# Patient Record
Sex: FEMALE | Employment: OTHER | URBAN - METROPOLITAN AREA
[De-identification: names, ages, dates, MRNs, and addresses within clinical notes are randomized per-mention and may not be internally consistent; named-entity substitution may affect disease eponyms.]

---

## 2024-04-08 NOTE — PROVIDER TRANSFER
Outside Transfer Acceptance Note / Regional Referral Center    Referring facility: [facility]   Referring provider: HELENA AVINA  Accepting facility: [facility]  Accepting provider: RAQUEL FRANKLIN  Admitting provider: Dr. Raquel Franklin  Reason for transfer: Higher Level of Care   Transfer diagnosis: biliary obstruction  Transfer specialty requested: Pancreatic Billiary  Transfer specialty notified: Yes  Transfer level: NUMBER 1-5: 2  Bed type requested: med-tele  Isolation status: No active isolations   Admission class or status: IP- Inpatient      Narrative     89 year old female with PMHx of HTN and afib (on eliquis and metoprolol) who presented to the ED with abdominal pain radiating to her back, found to have pancreatitis with initial lipase > 3000, EMILE, and elevated LFT's. Patient started on IVF and had HIDA scan which was concerning for common duct obstruction. MRCP done that showed some swelling of the head of the pancreas, concerning for extra-hepatic biliary dilation and concern that there was possible mass there and recommended EUS with ERCP. ERCP performed on Thursday and attempted to stent her but unable to do so due to difficulty visualizing due to angulation of her anatomy. Initial plan was to do repeat ERCP as outpatient however patient has been unable to tolerate PO intake and still with abdominal pain so transfer request for ERCP/EUS at [facility]. Vitals stable, on room air, eliquis has been on hold. Tbili 4.2, now down to normal. LFTs improved. On eliquis which has been on hold. Difficult to treat pain due to her medication allergies. Gotten toradol and dilaudid. Case discussed with Dr. Hudson who will consult on the patient. Per referring provider, can transfer back after ERCP for further management of pain control if needed.      Objective     Vitals:    Recent Labs: All pertinent labs within the past 24 hours have been  reviewed.  Recent imaging: as above   Airway:     Vent settings:         IV access:    Infusions: None  Allergies: Review of patient's allergies indicates:  Not on File   NPO: No    Anticoagulation:   Anticoagulants       None             Instructions      [facility]-  Admit to Hospital Medicine  Upon patient arrival to floor, please send SecureChat to [facility] or call extension [phone] (if no answer, do NOT leave a callback number after the beep, rather please send a SecureChat to [facility]), for Hospital Medicine admit team assignment and for additional admit orders for the patient.  Do not page the attending physician associated with the patient on arrival (this physician may not be on duty at the time of arrival).  Rather, always send a SecureChat to [facility] or call [phone] to reach the triage physician for orders and team assignment.    Consult AES

## 2024-04-12 ENCOUNTER — ANESTHESIA (OUTPATIENT)
Dept: ENDOSCOPY | Facility: HOSPITAL | Age: 89
DRG: 440 | End: 2024-04-12
Payer: MEDICARE

## 2024-04-12 ENCOUNTER — HOSPITAL ENCOUNTER (INPATIENT)
Facility: HOSPITAL | Age: 89
LOS: 1 days | Discharge: ANOTHER HOSPITAL | DRG: 440 | End: 2024-04-12
Attending: STUDENT IN AN ORGANIZED HEALTH CARE EDUCATION/TRAINING PROGRAM | Admitting: STUDENT IN AN ORGANIZED HEALTH CARE EDUCATION/TRAINING PROGRAM
Payer: MEDICARE

## 2024-04-12 ENCOUNTER — ANESTHESIA EVENT (OUTPATIENT)
Dept: ENDOSCOPY | Facility: HOSPITAL | Age: 89
DRG: 440 | End: 2024-04-12
Payer: MEDICARE

## 2024-04-12 VITALS
SYSTOLIC BLOOD PRESSURE: 144 MMHG | WEIGHT: 200 LBS | HEIGHT: 67 IN | HEART RATE: 88 BPM | TEMPERATURE: 98 F | DIASTOLIC BLOOD PRESSURE: 77 MMHG | RESPIRATION RATE: 18 BRPM | OXYGEN SATURATION: 96 % | BODY MASS INDEX: 31.39 KG/M2

## 2024-04-12 DIAGNOSIS — R11.0 NAUSEA: ICD-10-CM

## 2024-04-12 DIAGNOSIS — R07.9 CHEST PAIN: ICD-10-CM

## 2024-04-12 DIAGNOSIS — K85.90 ACUTE PANCREATITIS WITHOUT INFECTION OR NECROSIS, UNSPECIFIED PANCREATITIS TYPE: Primary | ICD-10-CM

## 2024-04-12 DIAGNOSIS — K83.1 BILIARY OBSTRUCTION: ICD-10-CM

## 2024-04-12 PROBLEM — I48.91 ATRIAL FIBRILLATION: Chronic | Status: ACTIVE | Noted: 2024-04-12

## 2024-04-12 PROBLEM — R74.01 TRANSAMINITIS: Status: ACTIVE | Noted: 2024-04-12

## 2024-04-12 PROBLEM — I10 HYPERTENSION: Chronic | Status: ACTIVE | Noted: 2024-04-12

## 2024-04-12 PROBLEM — N18.30 CKD (CHRONIC KIDNEY DISEASE) STAGE 3, GFR 30-59 ML/MIN: Chronic | Status: ACTIVE | Noted: 2024-04-12

## 2024-04-12 PROBLEM — Z86.79 HISTORY OF SICK SINUS SYNDROME: Status: ACTIVE | Noted: 2024-04-12

## 2024-04-12 LAB
ALBUMIN SERPL BCP-MCNC: 2.2 G/DL (ref 3.5–5.2)
ALP SERPL-CCNC: 136 U/L (ref 55–135)
ALT SERPL W/O P-5'-P-CCNC: 29 U/L (ref 10–44)
ANION GAP SERPL CALC-SCNC: 6 MMOL/L (ref 8–16)
AST SERPL-CCNC: 32 U/L (ref 10–40)
BASOPHILS # BLD AUTO: 0.05 K/UL (ref 0–0.2)
BASOPHILS NFR BLD: 0.5 % (ref 0–1.9)
BILIRUB SERPL-MCNC: 0.7 MG/DL (ref 0.1–1)
BNP SERPL-MCNC: 411 PG/ML (ref 0–99)
BUN SERPL-MCNC: 13 MG/DL (ref 10–30)
CALCIUM SERPL-MCNC: 8.8 MG/DL (ref 8.7–10.5)
CHLORIDE SERPL-SCNC: 101 MMOL/L (ref 95–110)
CO2 SERPL-SCNC: 28 MMOL/L (ref 23–29)
CREAT SERPL-MCNC: 0.8 MG/DL (ref 0.5–1.4)
DIFFERENTIAL METHOD BLD: ABNORMAL
EOSINOPHIL # BLD AUTO: 0.1 K/UL (ref 0–0.5)
EOSINOPHIL NFR BLD: 0.6 % (ref 0–8)
ERYTHROCYTE [DISTWIDTH] IN BLOOD BY AUTOMATED COUNT: 16.1 % (ref 11.5–14.5)
EST. GFR  (NO RACE VARIABLE): >60 ML/MIN/1.73 M^2
GLUCOSE SERPL-MCNC: 131 MG/DL (ref 70–110)
HCT VFR BLD AUTO: 33.4 % (ref 37–48.5)
HGB BLD-MCNC: 10.5 G/DL (ref 12–16)
IMM GRANULOCYTES # BLD AUTO: 0.36 K/UL (ref 0–0.04)
IMM GRANULOCYTES NFR BLD AUTO: 3.4 % (ref 0–0.5)
INR PPP: 1 (ref 0.8–1.2)
LIPASE SERPL-CCNC: 15 U/L (ref 4–60)
LYMPHOCYTES # BLD AUTO: 0.8 K/UL (ref 1–4.8)
LYMPHOCYTES NFR BLD: 7.6 % (ref 18–48)
MAGNESIUM SERPL-MCNC: 2 MG/DL (ref 1.6–2.6)
MCH RBC QN AUTO: 31.7 PG (ref 27–31)
MCHC RBC AUTO-ENTMCNC: 31.4 G/DL (ref 32–36)
MCV RBC AUTO: 101 FL (ref 82–98)
MONOCYTES # BLD AUTO: 1.2 K/UL (ref 0.3–1)
MONOCYTES NFR BLD: 11.6 % (ref 4–15)
NEUTROPHILS # BLD AUTO: 8.1 K/UL (ref 1.8–7.7)
NEUTROPHILS NFR BLD: 76.3 % (ref 38–73)
NRBC BLD-RTO: 0 /100 WBC
OHS QRS DURATION: 98 MS
OHS QTC CALCULATION: 427 MS
PHOSPHATE SERPL-MCNC: 4.9 MG/DL (ref 2.7–4.5)
PLATELET # BLD AUTO: 265 K/UL (ref 150–450)
PMV BLD AUTO: 11.6 FL (ref 9.2–12.9)
POTASSIUM SERPL-SCNC: 4.7 MMOL/L (ref 3.5–5.1)
PROT SERPL-MCNC: 5.4 G/DL (ref 6–8.4)
PROTHROMBIN TIME: 11.2 SEC (ref 9–12.5)
RBC # BLD AUTO: 3.31 M/UL (ref 4–5.4)
SODIUM SERPL-SCNC: 135 MMOL/L (ref 136–145)
WBC # BLD AUTO: 10.61 K/UL (ref 3.9–12.7)

## 2024-04-12 PROCEDURE — 43259 EGD US EXAM DUODENUM/JEJUNUM: CPT | Performed by: INTERNAL MEDICINE

## 2024-04-12 PROCEDURE — 25000003 PHARM REV CODE 250

## 2024-04-12 PROCEDURE — 85025 COMPLETE CBC W/AUTO DIFF WBC: CPT

## 2024-04-12 PROCEDURE — 80053 COMPREHEN METABOLIC PANEL: CPT

## 2024-04-12 PROCEDURE — 83735 ASSAY OF MAGNESIUM: CPT

## 2024-04-12 PROCEDURE — 83690 ASSAY OF LIPASE: CPT

## 2024-04-12 PROCEDURE — 25500020 PHARM REV CODE 255: Performed by: HOSPITALIST

## 2024-04-12 PROCEDURE — D9220A PRA ANESTHESIA: Mod: CRNA,,, | Performed by: NURSE ANESTHETIST, CERTIFIED REGISTERED

## 2024-04-12 PROCEDURE — 63600175 PHARM REV CODE 636 W HCPCS

## 2024-04-12 PROCEDURE — 93005 ELECTROCARDIOGRAM TRACING: CPT

## 2024-04-12 PROCEDURE — 99223 1ST HOSP IP/OBS HIGH 75: CPT | Mod: 25,GC,, | Performed by: INTERNAL MEDICINE

## 2024-04-12 PROCEDURE — 83880 ASSAY OF NATRIURETIC PEPTIDE: CPT

## 2024-04-12 PROCEDURE — D9220A PRA ANESTHESIA: Mod: ANES,,, | Performed by: ANESTHESIOLOGY

## 2024-04-12 PROCEDURE — 25000003 PHARM REV CODE 250: Performed by: NURSE ANESTHETIST, CERTIFIED REGISTERED

## 2024-04-12 PROCEDURE — 93010 ELECTROCARDIOGRAM REPORT: CPT | Mod: ,,, | Performed by: INTERNAL MEDICINE

## 2024-04-12 PROCEDURE — 0DJ08ZZ INSPECTION OF UPPER INTESTINAL TRACT, VIA NATURAL OR ARTIFICIAL OPENING ENDOSCOPIC: ICD-10-PCS | Performed by: INTERNAL MEDICINE

## 2024-04-12 PROCEDURE — 37000009 HC ANESTHESIA EA ADD 15 MINS: Performed by: INTERNAL MEDICINE

## 2024-04-12 PROCEDURE — 85610 PROTHROMBIN TIME: CPT

## 2024-04-12 PROCEDURE — 20600001 HC STEP DOWN PRIVATE ROOM

## 2024-04-12 PROCEDURE — 36415 COLL VENOUS BLD VENIPUNCTURE: CPT

## 2024-04-12 PROCEDURE — 97165 OT EVAL LOW COMPLEX 30 MIN: CPT

## 2024-04-12 PROCEDURE — 37000008 HC ANESTHESIA 1ST 15 MINUTES: Performed by: INTERNAL MEDICINE

## 2024-04-12 PROCEDURE — 84100 ASSAY OF PHOSPHORUS: CPT

## 2024-04-12 PROCEDURE — 43259 EGD US EXAM DUODENUM/JEJUNUM: CPT | Mod: ,,, | Performed by: INTERNAL MEDICINE

## 2024-04-12 PROCEDURE — 63600175 PHARM REV CODE 636 W HCPCS: Performed by: NURSE ANESTHETIST, CERTIFIED REGISTERED

## 2024-04-12 PROCEDURE — 97535 SELF CARE MNGMENT TRAINING: CPT

## 2024-04-12 PROCEDURE — 97162 PT EVAL MOD COMPLEX 30 MIN: CPT

## 2024-04-12 PROCEDURE — 97530 THERAPEUTIC ACTIVITIES: CPT

## 2024-04-12 RX ORDER — ONDANSETRON HYDROCHLORIDE 2 MG/ML
4 INJECTION, SOLUTION INTRAVENOUS DAILY PRN
Status: DISCONTINUED | OUTPATIENT
Start: 2024-04-12 | End: 2024-04-12 | Stop reason: HOSPADM

## 2024-04-12 RX ORDER — PROPOFOL 10 MG/ML
VIAL (ML) INTRAVENOUS
Status: DISCONTINUED | OUTPATIENT
Start: 2024-04-12 | End: 2024-04-12

## 2024-04-12 RX ORDER — HYDROMORPHONE HYDROCHLORIDE 1 MG/ML
1 INJECTION, SOLUTION INTRAMUSCULAR; INTRAVENOUS; SUBCUTANEOUS EVERY 6 HOURS PRN
Status: DISCONTINUED | OUTPATIENT
Start: 2024-04-12 | End: 2024-04-12

## 2024-04-12 RX ORDER — OXYBUTYNIN CHLORIDE 10 MG/1
10 TABLET, EXTENDED RELEASE ORAL
COMMUNITY
Start: 2023-11-21

## 2024-04-12 RX ORDER — CYANOCOBALAMIN 1000 UG/ML
1000 INJECTION, SOLUTION INTRAMUSCULAR; SUBCUTANEOUS DAILY
COMMUNITY
Start: 2024-03-25

## 2024-04-12 RX ORDER — FUROSEMIDE 10 MG/ML
20 INJECTION INTRAMUSCULAR; INTRAVENOUS ONCE
Status: COMPLETED | OUTPATIENT
Start: 2024-04-12 | End: 2024-04-12

## 2024-04-12 RX ORDER — HYDROMORPHONE HYDROCHLORIDE 1 MG/ML
0.5 INJECTION, SOLUTION INTRAMUSCULAR; INTRAVENOUS; SUBCUTANEOUS EVERY 6 HOURS PRN
Status: DISCONTINUED | OUTPATIENT
Start: 2024-04-12 | End: 2024-04-12

## 2024-04-12 RX ORDER — SENNOSIDES 8.6 MG/1
8.6 TABLET ORAL DAILY
Status: DISCONTINUED | OUTPATIENT
Start: 2024-04-12 | End: 2024-04-12

## 2024-04-12 RX ORDER — HYDROMORPHONE HYDROCHLORIDE 2 MG/1
2 TABLET ORAL EVERY 4 HOURS PRN
Status: DISCONTINUED | OUTPATIENT
Start: 2024-04-12 | End: 2024-04-13 | Stop reason: HOSPADM

## 2024-04-12 RX ORDER — IBUPROFEN 200 MG
16 TABLET ORAL
Status: DISCONTINUED | OUTPATIENT
Start: 2024-04-12 | End: 2024-04-13 | Stop reason: HOSPADM

## 2024-04-12 RX ORDER — LIDOCAINE HYDROCHLORIDE 20 MG/ML
INJECTION INTRAVENOUS
Status: DISCONTINUED | OUTPATIENT
Start: 2024-04-12 | End: 2024-04-12

## 2024-04-12 RX ORDER — FENTANYL CITRATE 50 UG/ML
25 INJECTION, SOLUTION INTRAMUSCULAR; INTRAVENOUS EVERY 5 MIN PRN
Status: COMPLETED | OUTPATIENT
Start: 2024-04-12 | End: 2024-04-12

## 2024-04-12 RX ORDER — SODIUM CHLORIDE 0.9 % (FLUSH) 0.9 %
10 SYRINGE (ML) INJECTION EVERY 12 HOURS PRN
Status: DISCONTINUED | OUTPATIENT
Start: 2024-04-12 | End: 2024-04-13 | Stop reason: HOSPADM

## 2024-04-12 RX ORDER — GLUCAGON 1 MG
1 KIT INJECTION
Status: DISCONTINUED | OUTPATIENT
Start: 2024-04-12 | End: 2024-04-13 | Stop reason: HOSPADM

## 2024-04-12 RX ORDER — APIXABAN 2.5 MG/1
2.5 TABLET, FILM COATED ORAL 2 TIMES DAILY
COMMUNITY
Start: 2024-03-07

## 2024-04-12 RX ORDER — LISINOPRIL 40 MG/1
40 TABLET ORAL
COMMUNITY
Start: 2024-03-13

## 2024-04-12 RX ORDER — METOPROLOL SUCCINATE 100 MG/1
100 TABLET, EXTENDED RELEASE ORAL
COMMUNITY
Start: 2024-03-20

## 2024-04-12 RX ORDER — NALOXONE HCL 0.4 MG/ML
0.02 VIAL (ML) INJECTION
Status: DISCONTINUED | OUTPATIENT
Start: 2024-04-12 | End: 2024-04-13 | Stop reason: HOSPADM

## 2024-04-12 RX ORDER — METOPROLOL SUCCINATE 100 MG/1
100 TABLET, EXTENDED RELEASE ORAL DAILY
Status: DISCONTINUED | OUTPATIENT
Start: 2024-04-12 | End: 2024-04-13 | Stop reason: HOSPADM

## 2024-04-12 RX ORDER — BISACODYL 10 MG/1
10 SUPPOSITORY RECTAL DAILY PRN
Status: DISCONTINUED | OUTPATIENT
Start: 2024-04-12 | End: 2024-04-13 | Stop reason: HOSPADM

## 2024-04-12 RX ORDER — HYDROMORPHONE HYDROCHLORIDE 1 MG/ML
0.5 INJECTION, SOLUTION INTRAMUSCULAR; INTRAVENOUS; SUBCUTANEOUS EVERY 6 HOURS PRN
Status: DISCONTINUED | OUTPATIENT
Start: 2024-04-12 | End: 2024-04-13 | Stop reason: HOSPADM

## 2024-04-12 RX ORDER — ESCITALOPRAM OXALATE 10 MG/1
10 TABLET ORAL
COMMUNITY
Start: 2024-03-20

## 2024-04-12 RX ORDER — AMLODIPINE BESYLATE 2.5 MG/1
2.5 TABLET ORAL DAILY
COMMUNITY
Start: 2024-03-20

## 2024-04-12 RX ORDER — ESCITALOPRAM OXALATE 10 MG/1
10 TABLET ORAL DAILY
Status: DISCONTINUED | OUTPATIENT
Start: 2024-04-12 | End: 2024-04-13 | Stop reason: HOSPADM

## 2024-04-12 RX ORDER — AMOXICILLIN 250 MG
2 CAPSULE ORAL 2 TIMES DAILY
Status: DISCONTINUED | OUTPATIENT
Start: 2024-04-12 | End: 2024-04-13 | Stop reason: HOSPADM

## 2024-04-12 RX ORDER — SODIUM CHLORIDE 0.9 % (FLUSH) 0.9 %
10 SYRINGE (ML) INJECTION
Status: DISCONTINUED | OUTPATIENT
Start: 2024-04-12 | End: 2024-04-12 | Stop reason: HOSPADM

## 2024-04-12 RX ORDER — TRAZODONE HYDROCHLORIDE 50 MG/1
50 TABLET ORAL NIGHTLY
COMMUNITY

## 2024-04-12 RX ORDER — PANTOPRAZOLE SODIUM 40 MG/1
40 TABLET, DELAYED RELEASE ORAL DAILY
Status: DISCONTINUED | OUTPATIENT
Start: 2024-04-12 | End: 2024-04-13 | Stop reason: HOSPADM

## 2024-04-12 RX ORDER — ASPIRIN 325 MG
50000 TABLET, DELAYED RELEASE (ENTERIC COATED) ORAL
COMMUNITY

## 2024-04-12 RX ORDER — POTASSIUM CHLORIDE 750 MG/1
10 TABLET, EXTENDED RELEASE ORAL DAILY
COMMUNITY
Start: 2024-03-20

## 2024-04-12 RX ORDER — POLYETHYLENE GLYCOL 3350 17 G/17G
17 POWDER, FOR SOLUTION ORAL 2 TIMES DAILY
Status: DISCONTINUED | OUTPATIENT
Start: 2024-04-12 | End: 2024-04-13 | Stop reason: HOSPADM

## 2024-04-12 RX ORDER — FUROSEMIDE 40 MG/1
40 TABLET ORAL
COMMUNITY
Start: 2024-03-20

## 2024-04-12 RX ORDER — IBUPROFEN 200 MG
24 TABLET ORAL
Status: DISCONTINUED | OUTPATIENT
Start: 2024-04-12 | End: 2024-04-13 | Stop reason: HOSPADM

## 2024-04-12 RX ADMIN — DOCUSATE SODIUM AND SENNOSIDES 2 TABLET: 8.6; 5 TABLET, FILM COATED ORAL at 08:04

## 2024-04-12 RX ADMIN — PROPOFOL 100 MCG/KG/MIN: 10 INJECTION, EMULSION INTRAVENOUS at 12:04

## 2024-04-12 RX ADMIN — IOHEXOL 100 ML: 350 INJECTION, SOLUTION INTRAVENOUS at 06:04

## 2024-04-12 RX ADMIN — LIDOCAINE HYDROCHLORIDE 60 MG: 20 INJECTION INTRAVENOUS at 12:04

## 2024-04-12 RX ADMIN — ESCITALOPRAM OXALATE 10 MG: 10 TABLET ORAL at 10:04

## 2024-04-12 RX ADMIN — HYDROMORPHONE HYDROCHLORIDE 0.5 MG: 1 INJECTION, SOLUTION INTRAMUSCULAR; INTRAVENOUS; SUBCUTANEOUS at 06:04

## 2024-04-12 RX ADMIN — POLYETHYLENE GLYCOL 3350 17 G: 17 POWDER, FOR SOLUTION ORAL at 08:04

## 2024-04-12 RX ADMIN — SENNOSIDES 8.6 MG: 8.6 TABLET, FILM COATED ORAL at 08:04

## 2024-04-12 RX ADMIN — HYDROMORPHONE HYDROCHLORIDE 2 MG: 2 TABLET ORAL at 04:04

## 2024-04-12 RX ADMIN — METOPROLOL SUCCINATE 100 MG: 100 TABLET, EXTENDED RELEASE ORAL at 08:04

## 2024-04-12 RX ADMIN — FENTANYL CITRATE 25 MCG: 50 INJECTION INTRAMUSCULAR; INTRAVENOUS at 01:04

## 2024-04-12 RX ADMIN — PROPOFOL 70 MG: 10 INJECTION, EMULSION INTRAVENOUS at 12:04

## 2024-04-12 RX ADMIN — HYDROMORPHONE HYDROCHLORIDE 1 MG: 1 INJECTION, SOLUTION INTRAMUSCULAR; INTRAVENOUS; SUBCUTANEOUS at 04:04

## 2024-04-12 RX ADMIN — FUROSEMIDE 20 MG: 10 INJECTION, SOLUTION INTRAMUSCULAR; INTRAVENOUS at 04:04

## 2024-04-12 RX ADMIN — PANTOPRAZOLE SODIUM 40 MG: 40 TABLET, DELAYED RELEASE ORAL at 08:04

## 2024-04-12 RX ADMIN — SODIUM CHLORIDE: 9 INJECTION, SOLUTION INTRAVENOUS at 12:04

## 2024-04-12 NOTE — CONSULTS
[facility] - Telemetry Stepdown  [facility] Consult Note    Patient Name: Zeny Tijerina  MRN: [id number]  Admission Date: 4/12/2024  Hospital Length of Stay: 0 days  Code Status: DNR   Attending Provider: Jessenia Rossi*   Consulting Provider: Chester Thurman MD  Primary Care Physician: Thuy, Primary Doctor  Principal Problem:Transaminitis    Inpatient consult to [facility] ([facility])  Consult performed by: Chester Thurman MD  Consult ordered by: Shefali Bravo MD        Subjective:     HPI:  Ms. Zeny Tijerina is a 89 year old female for whom AES is consulted for evaluation of biliary obstruction. She has a PMH significant for atrial fibrillation (on Apixaban), HTN, MICHELLE, and sick sinus syndrome (status post PPM).     Hospital Course:   Patient was admitted to [facility] on 3/30/2024 for management of acute pancreatitis associated with EMILE after developing acute onset bilateral upper abdominal pain radiating to the back. CTA C/A/P obtained on admission to investigate possible aortic dissection was only notable for AAA with inflammatory changes around pancreas. She underwent an MRCP on 3/31 that was notable for extra-hepatic biliary dilatation without associated stone or lesion seen. GI was consulted and patient underwent ERCP on 4/4 with inability to cannulate biliary tree due to reported difficulty visualizing the ampulla. EMILE and elevated LFT's progressively improved post-procedure and bilirubin normalized by 4/8. Patient continued to experience post-prandial abdominal pain and decision was made to transfer patient for consideration of repeat endoscopic evaluation. She was accepted for transfer on 4/8 and arrived here on 4/12.     On initial bedside interview, patient reports continued bilateral upper abdominal pain that is aggravated by attempted PO intake. She reports only consuming liquids since diet was resumed at [facility]. She denies prior lifetime history of pancreatitis or liver disease,  "melena, hematochezia, pale colored stools, preceding skin/eye yellowing, and prior abdominal surgeries.     No past medical history on file.    No past surgical history on file.    Review of patient's allergies indicates:   Allergen Reactions    Digoxin Anaphylaxis and Shortness Of Breath     Pt states that "she almost  from taking this medication"    Meperidine Shortness Of Breath    Morphine Shortness Of Breath    Penicillins Anaphylaxis    Ciprofloxacin Itching    Opioids - morphine analogues Itching, Nausea And Vomiting and Rash     Family History    None       Tobacco Use    Smoking status: Not on file    Smokeless tobacco: Not on file   Substance and Sexual Activity    Alcohol use: Not on file    Drug use: Not on file    Sexual activity: Not on file     Review of Systems   Constitutional:  Positive for fatigue. Negative for appetite change, chills and fever.   HENT:  Negative for congestion and trouble swallowing.    Eyes:  Negative for pain and redness.   Respiratory:  Negative for cough and shortness of breath.    Cardiovascular:  Negative for chest pain and palpitations.   Gastrointestinal:  Positive for abdominal pain. Negative for blood in stool, constipation, diarrhea, nausea and vomiting.   Genitourinary:  Negative for difficulty urinating and dysuria.   Musculoskeletal:  Negative for back pain and neck pain.   Skin:  Negative for rash.   Neurological:  Negative for dizziness, light-headedness and headaches.     Objective:     Vital Signs (Most Recent):  Temp: 99 °F (37.2 °C) (24)  Pulse: 84 (24 08)  Resp: 19 (24)  BP: (!) 146/63 (24)  SpO2: 98 % (2442) Vital Signs (24h Range):  Temp:  [97.8 °F (36.6 °C)-99 °F (37.2 °C)] 99 °F (37.2 °C)  Pulse:  [78-87] 84  Resp:  [16-19] 19  SpO2:  [94 %-98 %] 98 %  BP: (125-146)/(60-74) 146/63        There is no height or weight on file to calculate BMI.      Intake/Output Summary (Last 24 hours) at 2024 " 0943  Last data filed at 4/12/2024 0648  Gross per 24 hour   Intake --   Output 350 ml   Net -350 ml       Lines/Drains/Airways       Peripheral Intravenous Line  Duration                  Peripheral IV - Single Lumen 04/11/24 0000 Anterior;Right Upper Arm 1 day                     Physical Exam  Constitutional:       Appearance: Normal appearance. She is not ill-appearing.   Eyes:      General: No scleral icterus.  Cardiovascular:      Rate and Rhythm: Normal rate and regular rhythm.      Pulses: Normal pulses.      Heart sounds: Normal heart sounds.   Pulmonary:      Effort: Pulmonary effort is normal. No respiratory distress.      Breath sounds: Normal breath sounds.   Abdominal:      General: Bowel sounds are normal. There is no distension.      Palpations: Abdomen is soft.      Tenderness: There is abdominal tenderness in the right upper quadrant, epigastric area and left upper quadrant.   Skin:     General: Skin is warm and dry.      Coloration: Skin is not jaundiced.      Findings: No rash.   Neurological:      Mental Status: She is alert and oriented to person, place, and time.          Significant Labs:  All pertinent lab results from the last 24 hours have been reviewed.    Significant Imaging:  Imaging results within the past 24 hours have been reviewed.  Assessment/Plan:     GI  Acute pancreatitis  This is a 89 year old female with a PMH significant for atrial fibrillation (on Apixaban), HTN, MICHELLE, and sick sinus syndrome (status post PPM) who was admitted to [facility] on 4/12 as a transfer from [place] for management of acute pancreatitis with suspected biliary etiology following initial presentation on 3/30. She is status post attempted ERCP at [place] on 4/4 with inability to cannulate due to reported difficulty visualizing ampulla. LFT's have progressively improved since admission with bilirubin normalizing by 4/2. She has no signs/symptoms of cholangitis here or on initial admission. Per review  of MAR from [place], last dose of Apixaban was on 4/2.     Recommendations:     -Will proceed with EUS +/- ERCP today.   -Keep NPO.         Thank you for your consult. I will follow-up with patient. Please contact us if you have any additional questions.    Chester Thurman MD  Gastroenterology  [facility] - Telemetry Stepdown

## 2024-04-12 NOTE — PT/OT/SLP EVAL
Occupational Therapy  Co -  Evaluation with PT    Co-evaluation/treatment performed due to patient's multiple deficits requiring two skilled therapists to appropriately and safely assess patient's strength and endurance while facilitating functional tasks in addition to accommodating for patient's activity tolerance.       Name: Zeny Tijerina  MRN: [id number]  Admitting Diagnosis: Transaminitis  Recent Surgery: Procedure(s) (LRB):  ERCP (ENDOSCOPIC RETROGRADE CHOLANGIOPANCREATOGRAPHY) (N/A)  ULTRASOUND, UPPER GI TRACT, ENDOSCOPIC (N/A) Day of Surgery    Recommendations:     Discharge Recommendations: Moderate Intensity Therapy  Discharge Equipment Recommendations:  walker, rolling, bedside commode, shower chair  Barriers to discharge:   increased skilled A needed    Assessment:     Zeny Tijerina is a 89 y.o. female with a medical diagnosis of Transaminitis.  She presents with performance deficits affecting function: weakness, impaired endurance, impaired self care skills, impaired sensation, impaired functional mobility, gait instability, impaired balance, decreased coordination, decreased upper extremity function, decreased lower extremity function, decreased safety awareness, pain, decreased ROM, impaired coordination, impaired fine motor.      Pt engaged well in therapy this date, encountered supine with pleasant demeanor, agreeable to therapy. The patient has a history of falls and is a fall risk.  This date, pt required min-mod A of 2 persons for bed mobility, and STS transfer from EOB with mod A of 2 persons with EOB raised.  Pt able to sit EOB ~20min with SBA for good balance, though demo'd forward flexion with fatigue and to offload back pain. Patient currently demonstrates a need for moderate intensity therapy on a daily basis post acute secondary to a decline in functional status due to illness.       Rehab Prognosis: Good; patient would benefit from acute skilled OT services to address these deficits and  "reach maximum level of function.       Plan:     Patient to be seen 4 x/week to address the above listed problems via self-care/home management, therapeutic activities, therapeutic exercises  Plan of Care Expires: 05/12/24  Plan of Care Reviewed with: patient    Subjective     Chief Complaint: "I'm having some pain in my back"   Patient/Family Comments/goals: Get better, return home     Occupational Profile:  Living Environment: Pt lives with  (94yo) and daughter who currently has a broken shoulder.  They live in [facility] with small threshold step, WIS +stool +gb.   Previous level of function: Pt reports a fall 3-4 months ago 2* "balance" issues.  Prior to admit, PLOF was independence in ADLs and mod I with functional mobility using rollator   Roles and Routines:   Equipment Used at Home: rollator & 3 pt cane  Assistance upon Discharge: family ( is 94yo, daughter has broken shoulder)    Pain/Comfort:  Pain Rating 1: 7/10  Location - Side 1: Bilateral  Location 1: back  Pain Addressed 1: Reposition, Distraction  Pain Rating Post-Intervention 1: 9/10    Patients cultural, spiritual, Moravian conflicts given the current situation: no    Objective:     Communicated with: BRENDAN Rashid prior to session.  Patient found supine with bed alarm, peripheral IV, PureWick, oxygen, telemetry upon OT entry to room.    General Precautions: Standard, fall  Orthopedic Precautions: N/A  Braces: N/A  Respiratory Status: Nasal cannula, flow 2 L/min    Occupational Performance:    Bed Mobility:    Patient completed Rolling/Turning to Left with  minimum assistance across 6 trials  Patient completed Rolling/Turning to Right with minimum assistance across 6 trials  Patient completed Scooting/Bridging   To HOB while sitting EOB with maximal assistance of 2 persons across 2 trials  To EOB while sitting with max A  To middle of bed while sitting EOB with max A of 2 persons  To HOB while supine with bed in trendelenberg, total A of 2 " "persons   Patient completed Supine to Sit with minimum assistance and 2 persons  Patient completed Sit to Supine with moderate assistance and 2 persons  Pt sat EOB ~20min with good balance, after stands pt sat in forward flexion to offload back pain and 2* fatigue    Functional Mobility/Transfers:  Patient completed Sit <> Stand Transfer across 2 trials:   1st trial: from EOB with maximal assistance of 2 persons  with  RW, unable to clear hips from bed  2nd trial: mod A of 2 persons with RW, verbal cues for upright posture, limited 2* pain and fatigue. While standing ~1min, pt's brief removed    Activities of Daily Living:  Upper Body Dressing: moderate assistance doffing soiled gown, donning fresh gown   Lower Body Dressing: maximal assistance doffing soiled brief   Toileting: total assistance completing sheyla-care and replacing Purewick    Cognitive/Visual Perceptual:  Cognitive/Psychosocial Skills:     -       Oriented to: AOx4   -       Follows Commands/attention:Follows multistep  commands  -       Communication: clear/fluent  -       Memory: No Deficits noted  -       Safety awareness/insight to disability: impaired   -       Mood/Affect/Coping skills/emotional control: Pleasant  Visual/Perceptual:      -Intact      Physical Exam:  Balance:    -       Sitting: Good. Standing: Poor  Postural examination/scapula alignment:    -       Rounded shoulders  -       Forward head  Skin integrity: Visible skin intact  Edema:  Moderate BLE  Sensation:    -       Impaired  "tingling in my arms and hands"   Motor Planning:    -       Impaired  Dominant hand:    -       Right  Upper Extremity Range of Motion:     -       Right Upper Extremity: WFL  -       Left Upper Extremity: WFL  Upper Extremity Strength:    -       Right Upper Extremity: WFL  -       Left Upper Extremity: WFL   Strength:    -       Right Upper Extremity: WFL  -       Left Upper Extremity: WFL  Neurological:    -       Impaired    [facility] 6 Click " ADL:  AMPAC Total Score: 17    Treatment & Education:  Pt educated on role of OT, POC, and goals for therapy.    POC was dicussed with patient/caregiver, who was included in its development and is in agreement with the identified goals and treatment plan.   Patient and family aware of patient's deficits and therapy progression.   Time provided for therapeutic counseling and discussion of health disposition.   Educated on importance of EOB/OOB mobility, maintaining routine, sitting up in chair, and maximizing independence with ADLs during admission   Pt completed ADLs and functional mobility for treatment session as noted above   Pt/caregiver verbalized understanding and expressed no further concerns/questions.  Updated communication board with level of assist required      Patient left supine with all lines intact, call button in reach, bed alarm on, and RN notified    GOALS:   Multidisciplinary Problems       Occupational Therapy Goals          Problem: Occupational Therapy    Goal Priority Disciplines Outcome Interventions   Occupational Therapy Goal     OT, PT/OT Ongoing, Progressing    Description: Goals to be met by: 5/12/24     Patient will increase functional independence with ADLs by performing:    UE Dressing with Contact Guard Assistance.  LE Dressing with Moderate Assistance.  Grooming while EOB with Stand-by Assistance.  Toileting from bedside commode with Contact Guard Assistance for hygiene and clothing management.   Rolling to Bilateral with Contact Guard Assistance.   Supine to sit with Contact Guard Assistance.  Stand pivot transfers with Moderate Assistance.with AD as needed  Step transfer with Moderate Assistance with AD as needed  Toilet transfer to bedside commode with Moderate Assistance with AD as needed.                         History:     Past Medical History:   Diagnosis Date    Atrial fibrillation     CKD (chronic kidney disease)     MICHELLE (obstructive sleep apnea)        History reviewed.  No pertinent surgical history.    Time Tracking:     OT Date of Treatment: 04/12/24  OT Start Time: 0903  OT Stop Time: 0943  OT Total Time (min): 40 min    Billable Minutes:Evaluation 8  Self Care/Home Management 32    4/12/2024

## 2024-04-12 NOTE — ANESTHESIA PREPROCEDURE EVALUATION
"[facility]  Anesthesia Pre-Operative Evaluation   2024        Zeny Tijerina, 10/10/1930  [id number]  Procedure(s) (LRB):  ERCP (ENDOSCOPIC RETROGRADE CHOLANGIOPANCREATOGRAPHY) (N/A)  ULTRASOUND, UPPER GI TRACT, ENDOSCOPIC (N/A)    Subjective    Zeny Tijerina is a 89 y.o. female w/ a significant PMHx of HTN, MICHELLE, Sick sinus syndrome (s/p PPM), CKD3, Afib. Admitted for biliary obstruction.    Patient now presents for above procedure(s).     Level of Care: Med/Surg  Hemodynamics: No vasopressor or inotropic support.  Respiratory Status: Room air    ECHO:  No results found for this or any previous visit.      Prev Airway: None documented.    LDA:        Peripheral IV - Single Lumen 24 0000 Anterior;Right Upper Arm (Active)   Number of days: 1       Drips: None documented.      Patient Active Problem List   Diagnosis    Acute pancreatitis    Transaminitis    Atrial fibrillation    CKD (chronic kidney disease) stage 3, GFR 30-59 ml/min    Hypertension    History of sick sinus syndrome       Review of patient's allergies indicates:   Allergen Reactions    Digoxin Anaphylaxis and Shortness Of Breath     Pt states that "she almost  from taking this medication"    Meperidine Shortness Of Breath    Morphine Shortness Of Breath    Penicillins Anaphylaxis    Ciprofloxacin Itching    Opioids - morphine analogues Itching, Nausea And Vomiting and Rash       Current Inpatient Medications:    EScitalopram oxalate  10 mg Oral Daily    metoprolol succinate  100 mg Oral Daily    pantoprazole  40 mg Oral Daily    senna  8.6 mg Oral Daily       No current facility-administered medications on file prior to encounter.     Current Outpatient Medications on File Prior to Encounter   Medication Sig Dispense Refill    amLODIPine (NORVASC) 2.5 MG tablet Take 2.5 mg by mouth once daily.      cyanocobalamin 1,000 mcg/mL injection Inject 1,000 mcg into the muscle once daily.      ELIQUIS 2.5 mg Tab Take 2.5 mg by " mouth 2 (two) times daily.      EScitalopram oxalate (LEXAPRO) 10 MG tablet Take 10 mg by mouth.      furosemide (LASIX) 40 MG tablet Take 40 mg by mouth.      lisinopriL (PRINIVIL,ZESTRIL) 40 MG tablet Take 40 mg by mouth.      metoprolol succinate (TOPROL-XL) 100 MG 24 hr tablet Take 100 mg by mouth.      oxybutynin (DITROPAN-XL) 10 MG 24 hr tablet Take 10 mg by mouth.      potassium chloride (KLOR-CON) 10 MEQ TbSR Take 10 mEq by mouth once daily.      cholecalciferol, vitamin D3, 1,250 mcg (50,000 unit) capsule Take 50,000 Units by mouth every 7 days.      traZODone (DESYREL) 50 MG tablet Take 50 mg by mouth every evening.         No past surgical history on file.    Social History:  Tobacco Use: Not on file       Alcohol Use: Not on file       Objective    Vital Signs Range:  BMI Readings from Last 1 Encounters:   No data found for BMI       Temp:  [36.6 °C (97.9 °F)-37.2 °C (99 °F)]   Pulse:  [84-87]   Resp:  [16-19]   BP: (125-146)/(63-74)   SpO2:  [97 %-98 %]        Significant Labs:        Component Value Date/Time    WBC 10.61 04/12/2024 0556    HGB 10.5 (L) 04/12/2024 0556    HCT 33.4 (L) 04/12/2024 0556     04/12/2024 0556     (L) 04/12/2024 0556    K 4.7 04/12/2024 0556     04/12/2024 0556    CO2 28 04/12/2024 0556     (H) 04/12/2024 0556    BUN 13 04/12/2024 0556    CREATININE 0.8 04/12/2024 0556    MG 2.0 04/12/2024 0556    PHOS 4.9 (H) 04/12/2024 0556    CALCIUM 8.8 04/12/2024 0556    ALBUMIN 2.2 (L) 04/12/2024 0556    PROT 5.4 (L) 04/12/2024 0556    ALKPHOS 136 (H) 04/12/2024 0556    BILITOT 0.7 04/12/2024 0556    AST 32 04/12/2024 0556    ALT 29 04/12/2024 0556    INR 1.0 04/12/2024 0556        Please see Results Review for additional labs.     Diagnostic Studies: All relevant studies, reviewed.      EKG:   Results for orders placed or performed during the hospital encounter of 04/12/24   EKG 12-lead    Collection Time: 04/12/24  6:03 AM   Result Value Ref Range    QRS  Duration 98 ms    [facility] QTC Calculation 427 ms    Narrative    Test Reason : R11.0,    Vent. Rate : 084 BPM     Atrial Rate : 000 BPM     P-R Int : 000 ms          QRS Dur : 098 ms      QT Int : 362 ms       P-R-T Axes : 000 039 154 degrees     QTc Int : 427 ms    Atrial fibrillation  T wave abnormality, consider lateral ischemia  Abnormal ECG  No previous ECGs available  Confirmed by Neri VALENZUELA MD ([id number]) on 4/12/2024 9:41:05 AM    Referred By: HELENA AVINA           Confirmed By:Neri VALENZUELA MD                                                                                                                  04/12/2024  Zeny Tijerina is a 89 y.o., female.      Pre-op Assessment    I have reviewed the Patient Summary Reports.     I have reviewed the Nursing Notes. I have reviewed the NPO Status.   I have reviewed the Medications.     Review of Systems  Anesthesia Hx:  No problems with previous Anesthesia   Neg history of prior surgery.          Denies Family Hx of Anesthesia complications.    Denies Personal Hx of Anesthesia complications.                    Cardiovascular:  Exercise tolerance: good   Hypertension    Denies CAD.    Denies CABG/stent. Dysrhythmias atrial fibrillation   Denies CHF.    no hyperlipidemia   ECG has been reviewed. Sick sinus syndrome s/p PPM                         Pulmonary:    Denies COPD.  Denies Asthma.    Sleep Apnea                Renal/:  Chronic Renal Disease, CKD                Hepatic/GI:      Denies GERD.   Biliary obstruction          Neurological:    Denies CVA.    Denies Headaches. Denies Seizures.                                Endocrine:  Denies Diabetes.         Denies Obesity / BMI > 30  Psych:  Denies Psychiatric History.                  Physical Exam  General: Well nourished, Cooperative, Alert and Oriented    Airway:  Mallampati: II / II  Mouth Opening: Small, but > 3cm  TM Distance: Normal  Tongue: Normal  Neck ROM: Normal ROM    Dental:  Partial  Dentures    Chest/Lungs:  PPM      Anesthesia Plan  Type of Anesthesia, risks & benefits discussed:    Anesthesia Type: Gen ETT, Gen Natural Airway, MAC  Intra-op Monitoring Plan: Standard ASA Monitors  Post Op Pain Control Plan: multimodal analgesia and IV/PO Opioids PRN  Induction:  IV  Airway Plan: Direct and Video, Post-Induction  Informed Consent: Informed consent signed with the Patient and all parties understand the risks and agree with anesthesia plan.  All questions answered. Patient consented to blood products? Yes  ASA Score: 3  Day of Surgery Review of History & Physical: H&P Update referred to the surgeon/provider.  Anesthesia Plan Notes: Plan for general natural airway, discussed anesthetic risks, questions answered    Ready For Surgery From Anesthesia Perspective.     .

## 2024-04-12 NOTE — PROVATION PATIENT INSTRUCTIONS
Discharge Summary/Instructions after an Endoscopic Procedure  Patient Name: Zeny Tijerina  Patient MRN: [id number]  Patient YOB: 1930  Friday, April 12, 2024  Marco Hudsno MD  Dear patient,  As a result of recent federal legislation (The Federal Cures Act), you may   receive lab or pathology results from your procedure in your [facility]   account before your physician is able to contact you. Your physician or   their representative will relay the results to you with their   recommendations at their soonest availability.  Thank you,  RESTRICTIONS:  During your procedure today, you received medications for sedation.  These   medications may affect your judgment, balance and coordination.  Therefore,   for 24 hours, you have the following restrictions:   - DO NOT drive a car, operate machinery, make legal/financial decisions,   sign important papers or drink alcohol.    ACTIVITY:  Today: no heavy lifting, straining or running due to procedural   sedation/anesthesia.  The following day: return to full activity including work.  DIET:  Eat and drink normally unless instructed otherwise.     TREATMENT FOR COMMON SIDE EFFECTS:  - Mild abdominal pain, nausea, belching, bloating or excessive gas:  rest,   eat lightly and use a heating pad.  - Sore Throat: treat with throat lozenges and/or gargle with warm salt   water.  - Because air was used during the procedure, expelling large amounts of air   from your rectum or belching is normal.  - If a bowel prep was taken, you may not have a bowel movement for 1-3 days.    This is normal.  SYMPTOMS TO WATCH FOR AND REPORT TO YOUR PHYSICIAN:  1. Abdominal pain or bloating, other than gas cramps.  2. Chest pain.  3. Back pain.  4. Signs of infection such as: chills or fever occurring within 24 hours   after the procedure.  5. Rectal bleeding, which would show as bright red, maroon, or black stools.   (A tablespoon of blood from the rectum is not serious, especially if    hemorrhoids are present.)  6. Vomiting.  7. Weakness or dizziness.  GO DIRECTLY TO THE NEAREST EMERGENCY ROOM IF YOU HAVE ANY OF THE FOLLOWING:      Difficulty breathing              Chills and/or fever over 101 F   Persistent vomiting and/or vomiting blood   Severe abdominal pain   Severe chest pain   Black, tarry stools   Bleeding- more than one tablespoon   Any other symptom or condition that you feel may need urgent attention  Your doctor recommends these additional instructions:  If any biopsies were taken, your doctors clinic will contact you in 1 to 2   weeks with any results.  - Return patient to [facility] for [facility] care.   - Resume regular diet.   - Perform CT scan (computed tomography) of the abdomen with contrast at   appointment to be scheduled.   - May need repeat imaging via EUS once pancreatitis improves  For questions, problems or results please call your physician - Marco Hudson MD at Work:  [phone].  [facility], EMERGENCY ROOM PHONE NUMBER: [phone]  IF A COMPLICATION OR EMERGENCY SITUATION ARISES AND YOU ARE UNABLE TO REACH   YOUR PHYSICIAN - GO DIRECTLY TO THE EMERGENCY ROOM.  Marco Hudson MD  4/12/2024 1:07:48 PM  This report has been verified and signed electronically.  Dear patient,  As a result of recent federal legislation (The Federal Cures Act), you may   receive lab or pathology results from your procedure in your [facility]   account before your physician is able to contact you. Your physician or   their representative will relay the results to you with their   recommendations at their soonest availability.  Thank you,  PROVATION

## 2024-04-12 NOTE — NURSING
Nurses Note -- 4 Eyes      4/12/2024   3:42 AM      Skin assessed during: Admit      [x] No Altered Skin Integrity Present    []Prevention Measures Documented      [] Yes- Altered Skin Integrity Present or Discovered   [] LDA Added if Not in Epic (Describe Wound)   [] New Altered Skin Integrity was Present on Admit and Documented in LDA   [] Wound Image Taken    Wound Care Consulted? No    Attending Nurse:  Haider CASTILLO    Second RN/Staff Member:  Henny MCMANUS

## 2024-04-12 NOTE — H&P
[facility] Medicine  History & Physical    Patient Name: Zeny Tijerina  MRN: [id number]  Patient Class: IP- Inpatient  Admission Date: 4/12/2024  Attending Physician: Jessenia Rossi*   Primary Care Provider: Thuy, Primary Doctor         Patient information was obtained from patient, past medical records, and ER records.     Subjective:     Principal Problem:Transaminitis    Chief Complaint: No chief complaint on file.       HPI: Zeny Tijerina is a 89yo female with HTN, afib (on eliquis), SSS s/p PCM, CKD3 who presented as a transfer from [place] for ERCP with difficult anatomy. She presented to the ED 3/30/24 with abdominal pain radiating to her back, found to have pancreatitis with initial lipase > 3000, EMILE, and elevated LFT's. MRCP with extrahepatic biliary dilatation without a definite filling defect identified to suggest choledocholithiasis, gradual tapering of the common bile duct which could represent stricture at the ampulla, concern for a mass, and acute pancreatitis HIDA scan which was concerning for common duct obstruction. She was started on IVF, but they had to be discontinued due to development of pulmonary edema. Echo showed borderline low EF. ERCP performed on Thursday and attempted to stent her but unable to do so due to difficulty visualizing due to angulation of her anatomy. Initial plan was to do repeat ERCP as outpatient however patient has been unable to tolerate PO intake and still with abdominal pain so transfer request for ERCP/EUS at [facility]. Vitals stable, on room air. Tbili 4.2, now down to normal. LFTs improved.     On arrival to [facility], patient afebrile, VSS, on 2L NC SpO2 97%. She reports that she continues to have upper abdominal pain with radiation to back. She denies nausea/vomiting. She reports last BM was 2 days after admission to [place], but she has only been taking in liquids. Denies f/c. Some SOB, does not wear O2 at home.      No past medical history on file.    No past surgical history on file.    Review of patient's allergies indicates:  Not on File    No current facility-administered medications on file prior to encounter.     No current outpatient medications on file prior to encounter.     Family History    None       Tobacco Use    Smoking status: Not on file    Smokeless tobacco: Not on file   Substance and Sexual Activity    Alcohol use: Not on file    Drug use: Not on file    Sexual activity: Not on file     Review of Systems   Constitutional:  Negative for appetite change, chills and fever.   HENT:  Negative for congestion.    Respiratory:  Negative for cough and shortness of breath.    Cardiovascular:  Positive for leg swelling. Negative for chest pain.   Gastrointestinal:  Positive for abdominal pain and constipation. Negative for blood in stool, diarrhea, nausea and vomiting.   Genitourinary:  Negative for difficulty urinating.   Musculoskeletal:  Positive for back pain.   Skin:  Negative for rash.   Neurological:  Negative for light-headedness.   Psychiatric/Behavioral:  Negative for confusion.      Objective:     Vital Signs (Most Recent):  Temp: 97.9 °F (36.6 °C) (04/12/24 0318)  Pulse: 87 (04/12/24 0318)  Resp: 16 (04/12/24 0318)  BP: 125/74 (04/12/24 0318)  SpO2: 97 % (04/12/24 0318) Vital Signs (24h Range):  Temp:  [97.8 °F (36.6 °C)-98.7 °F (37.1 °C)] 97.9 °F (36.6 °C)  Pulse:  [78-87] 87  Resp:  [16] 16  SpO2:  [94 %-99 %] 97 %  BP: (125-145)/(60-74) 125/74        There is no height or weight on file to calculate BMI.     Physical Exam  Vitals and nursing note reviewed.   Constitutional:       General: She is not in acute distress.     Appearance: She is normal weight. She is not toxic-appearing.   HENT:      Head: Normocephalic and atraumatic.      Right Ear: External ear normal.      Left Ear: External ear normal.      Nose: Nose normal.   Eyes:      General: No scleral icterus.  Cardiovascular:      Rate and  Rhythm: Normal rate. Rhythm irregular.      Heart sounds: No murmur heard.  Pulmonary:      Effort: Pulmonary effort is normal. No respiratory distress.      Breath sounds: No wheezing.      Comments: Decreased breath sounds  Abdominal:      General: Abdomen is flat. Bowel sounds are normal. There is no distension.      Tenderness: There is abdominal tenderness (mild epigastric and RUQ). There is no rebound.   Musculoskeletal:      Right lower leg: Edema (pitting edema to buttocks) present.      Left lower leg: Edema present.   Neurological:      Mental Status: She is alert. Mental status is at baseline.                Significant Labs: All pertinent labs within the past 24 hours have been reviewed.    Significant Imaging: I have reviewed all pertinent imaging results/findings within the past 24 hours.  Assessment/Plan:     * Transaminitis  89F transferred from [place] for ERCP with difficult anatomy. MRCP and HIDA with concern for common duct obstruction and possible mass. Also noted to have acute pancreatitis. She was on IVF but had to be d/c due to development of pulmonary edema. EF 50-55%. Still on 2L O2 and with pitting edema to buttocks on exam. Liver enzymes, T bili, and lipase now normalized but with persistent pain.  PRN pain control, was receiving IV dilaudid at OSH, reported multiple allergies to pain meds  F/u CMP, lipase  F/u PT-INR  One time dose lasix 20mg IV, consider additional dosing as appropriate  AES consult  NPO         Acute pancreatitis  See transaminitis.      Atrial fibrillation  Patient with atrial fibrillation which is uncontrolled currently with Beta Blocker. Patient is currently in atrial fibrillation.[id number] Score: 3. Anticoagulation indicated. Anticoagulation done with heparin . On Eliquis at home, was getting lovenox at OSH.  Holding AC in setting of pending procedure, resume when appropriate    History of sick sinus syndrome  S/p PCM      Hypertension  Chronic, controlled.  Latest blood pressure and vitals reviewed-     Temp:  [97.8 °F (36.6 °C)-98.7 °F (37.1 °C)]   Pulse:  [78-87]   Resp:  [16]   BP: (125-145)/(60-74)   SpO2:  [94 %-99 %] .   Home meds for hypertension were reviewed and noted below.   Per outside records, taking amlodipine 2.5mg and lisinopril 40mg  Lasix 20mg PRN for weight gain  Metoprolol succinate 100mg daily    While in the hospital, will manage blood pressure as follows; Adjust home antihypertensive regimen as follows- continue metoprolol succinate    Will utilize p.r.n. blood pressure medication only if patient's blood pressure greater than 180/110 and she develops symptoms such as worsening chest pain or shortness of breath.    CKD (chronic kidney disease) stage 3, GFR 30-59 ml/min  Creatine stable for now. BMP reviewed- noted CrCl cannot be calculated (Unknown ideal weight.). according to latest data. Based on current GFR, CKD stage is stage 3 - GFR 30-59.  Monitor UOP and serial BMP and adjust therapy as needed. Renally dose meds. Avoid nephrotoxic medications and procedures. Baseline Cr ~1.0      VTE Risk Mitigation (From admission, onward)           Ordered     IP VTE HIGH RISK PATIENT  Once         04/12/24 0401     Place sequential compression device  Until discontinued         04/12/24 0401     Reason for No Pharmacological VTE Prophylaxis  Once        Question:  Reasons:  Answer:  IV Heparin w/in 24 hrs. Pre or Post-Op    04/12/24 0401                                    Sheafli Bravo MD  Department of Hospital Medicine  [facility] - Telemetry Stepdown

## 2024-04-12 NOTE — ANESTHESIA POSTPROCEDURE EVALUATION
Anesthesia Post Evaluation    Patient: Zeny Tijerina    Procedure(s) Performed: Procedure(s) (LRB):  ERCP (ENDOSCOPIC RETROGRADE CHOLANGIOPANCREATOGRAPHY) (N/A)  ULTRASOUND, UPPER GI TRACT, ENDOSCOPIC (N/A)    Final Anesthesia Type: general      Patient location during evaluation: PACU  Patient participation: Yes- Able to Participate  Level of consciousness: awake and alert  Post-procedure vital signs: reviewed and stable  Pain management: adequate  Airway patency: patent    PONV status at discharge: No PONV  Anesthetic complications: no      Cardiovascular status: blood pressure returned to baseline  Respiratory status: unassisted  Hydration status: euvolemic  Follow-up not needed.              Vitals Value Taken Time   /64 04/12/24 1503   Temp 36.7 °C (98.1 °F) 04/12/24 1503   Pulse 93 04/12/24 1503   Resp 18 04/12/24 1843   SpO2 93 % 04/12/24 1503         Event Time   Out of Recovery 14:46:00         Pain/Saeed Score: Pain Rating Prior to Med Admin: 6 (4/12/2024  6:43 PM)  Pain Rating Post Med Admin: 0 (4/12/2024  5:43 PM)  Saeed Score: 9 (4/12/2024  1:46 PM)

## 2024-04-12 NOTE — HOSPITAL COURSE
Ms. Tijerina is a 89-year-old female who is transferring from [place] after a prolonged stay for acute biliary pancreatitis.  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  Her liver function tests had normalized, but the patient was still experiencing upper abdominal pain that was requiring IV opioids and she had very poor oral intake.  On arrival she was still complaining of pain, was made NPO and was evaluated by AES.  They decided to proceed with an EUS, and decided that there was no need for an ERCP.  Following the procedure, advanced endoscopy recommended to repeat CT abdomen pelvis with pancreas protocol to re-evaluate the pancreas and assess for developing peripancreatic fluid collections.  There is consideration for possible EUS once inflammation improves to ensure that there is no underlying mass or lesion in the pancreas.  She was seen after the procedure and was doing very well.  She was started on a clear liquid diet with a goal to advance as tolerated.  Her pain regimen was transitioned to oral hydromorphone (due to report allergies prior to transfer) with IV hydromorphone as breakthrough. [place] was contacted for transfer back and accepted the patient.

## 2024-04-12 NOTE — DISCHARGE SUMMARY
[facility] - Telemetry [facility] Medicine  Discharge Summary      Patient Name: Zeny Tijerina  MRN: [id number]  LIBORIO: [id number]  Patient Class: IP- Inpatient  Admission Date: 4/12/2024  Hospital Length of Stay: 0 days  Discharge Date and Time:  04/12/2024 5:50 PM  Attending Physician: Jessenia Rossi*   Discharging Provider: Cornel Benavides MD  Primary Care Provider: Thuy Primary Doctor  [facility] Medicine Team: [facility] HOSP MED 1 Cornel Benavides MD  Primary Care Team: [facility] 1    HPI:   Zeny Tijerina is a 89yo female with HTN, afib (on eliquis), SSS s/p PCM, CKD3 who presented as a transfer from [place] for ERCP with difficult anatomy. She presented to the ED 3/30/24 with abdominal pain radiating to her back, found to have pancreatitis with initial lipase > 3000, EMILE, and elevated LFT's. MRCP with extrahepatic biliary dilatation without a definite filling defect identified to suggest choledocholithiasis, gradual tapering of the common bile duct which could represent stricture at the ampulla, concern for a mass, and acute pancreatitis HIDA scan which was concerning for common duct obstruction. She was started on IVF, but they had to be discontinued due to development of pulmonary edema. Echo showed borderline low EF. ERCP performed on Thursday and attempted to stent her but unable to do so due to difficulty visualizing due to angulation of her anatomy. Initial plan was to do repeat ERCP as outpatient however patient has been unable to tolerate PO intake and still with abdominal pain so transfer request for ERCP/EUS at [facility]. Vitals stable, on room air. Tbili 4.2, now down to normal. LFTs improved.     On arrival to [facility], patient afebrile, VSS, on 2L NC SpO2 97%. She reports that she continues to have upper abdominal pain with radiation to back. She denies nausea/vomiting. She reports last BM was 2 days after admission to [place], but she has only been taking in liquids. Denies  f/c. Some SOB, does not wear O2 at home.     Procedure(s) (LRB):  ERCP (ENDOSCOPIC RETROGRADE CHOLANGIOPANCREATOGRAPHY) (N/A)  ULTRASOUND, UPPER GI TRACT, ENDOSCOPIC (N/A)      Hospital Course:   Ms. Tijerina is a 89-year-old female who is transferring from [place] after a prolonged stay for acute biliary pancreatitis.  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  Her liver function tests had normalized, but the patient was still experiencing upper abdominal pain that was requiring IV opioids and she had very poor oral intake.  On arrival she was still complaining of pain, was made NPO and was evaluated by AES.  They decided to proceed with an EUS, and decided that there was no need for an ERCP.  Following the procedure, advanced endoscopy recommended to repeat CT abdomen pelvis with pancreas protocol to re-evaluate the pancreas and assess for developing peripancreatic fluid collections.  There is consideration for possible EUS once inflammation improves to ensure that there is no underlying mass or lesion in the pancreas.  She was seen after the procedure and was doing very well.  She was started on a clear liquid diet with a goal to advance as tolerated.  Her pain regimen was transitioned to oral hydromorphone (due to report allergies prior to transfer) with IV hydromorphone as breakthrough. [place] was contacted for transfer back and accepted the patient.         Goals of Care Treatment Preferences:  Code Status: DNR      Consults:   Consults (From admission, onward)          Status Ordering Provider     Inpatient consult to [facility] ([facility])  Once        Provider:  (Not yet assigned)    TOMAS López            No new Assessment & Plan notes have been filed under this hospital service since the last  note was generated.  Service: Hospital Medicine    Final Active Diagnoses:    Diagnosis Date Noted POA    PRINCIPAL PROBLEM:  Transaminitis [R74.01] 04/12/2024 Yes    Acute pancreatitis [K85.90] 04/12/2024 Yes    Atrial fibrillation [I48.91] 04/12/2024 Yes     Chronic    CKD (chronic kidney disease) stage 3, GFR 30-59 ml/min [N18.30] 04/12/2024 Yes     Chronic    Hypertension [I10] 04/12/2024 Yes     Chronic    History of sick sinus syndrome [Z86.79] 04/12/2024 Not Applicable      Problems Resolved During this Admission:       Discharged Condition: stable    Disposition: Another Health Care Inst*    Follow Up:    Patient Instructions:   No discharge procedures on file.    Significant Diagnostic Studies: Labs: CMP   Recent Labs   Lab 04/11/24  0323 04/12/24  0556    135*   K 4.8 4.7   CL  --  101   CO2 29 28   GLU  --  131*   BUN 13 13   CREATININE 0.79 0.8   CALCIUM 8.6* 8.8   PROT 5.0* 5.4*   ALBUMIN 2.6* 2.2*   BILITOT 0.8 0.7   ALKPHOS 146* 136*   AST 31 32   ALT 36 29   ANIONGAP 6* 6*    and CBC   Recent Labs   Lab 04/12/24  0556   WBC 10.61   HGB 10.5*   HCT 33.4*          Pending Diagnostic Studies:       Procedure Component Value Units Date/Time    CT ABDOMEN PELVIS WITH CONTRAST (PANCREAS PROTOCOL) [[id number]]     Order Status: Sent Lab Status: No result     FL ERCP Biliary And Pancreatic By Non Rad Tech [[id number]]     Order Status: Sent Lab Status: No result            Medications:  Reconciled Home Medications:      Medication List        CONTINUE taking these medications      amLODIPine 2.5 MG tablet  Commonly known as: NORVASC  Take 2.5 mg by mouth once daily.     cholecalciferol (vitamin D3) 1,250 mcg (50,000 unit) capsule  Take 50,000 Units by mouth every 7 days.     cyanocobalamin 1,000 mcg/mL injection  Inject 1,000 mcg into the muscle once daily.     ELIQUIS 2.5 mg Tab  Generic drug: apixaban  Take 2.5 mg by mouth 2 (two) times daily.     EScitalopram oxalate 10 MG tablet  Commonly  known as: LEXAPRO  Take 10 mg by mouth.     furosemide 40 MG tablet  Commonly known as: LASIX  Take 40 mg by mouth.     lisinopriL 40 MG tablet  Commonly known as: PRINIVIL,ZESTRIL  Take 40 mg by mouth.     metoprolol succinate 100 MG 24 hr tablet  Commonly known as: TOPROL-XL  Take 100 mg by mouth.     oxybutynin 10 MG 24 hr tablet  Commonly known as: DITROPAN-XL  Take 10 mg by mouth.     potassium chloride 10 MEQ Tbsr  Commonly known as: KLOR-CON  Take 10 mEq by mouth once daily.     traZODone 50 MG tablet  Commonly known as: DESYREL  Take 50 mg by mouth every evening.              Indwelling Lines/Drains at time of discharge:   Lines/Drains/Airways       None                   Time spent on the discharge of patient: 30 minutes         Cornel Benavides MD  Department of Hospital Medicine  [facility] - Telemetry Stepdown

## 2024-04-12 NOTE — ASSESSMENT & PLAN NOTE
This is a 89 year old female with a PMH significant for atrial fibrillation (on Apixaban), HTN, MICHELLE, and sick sinus syndrome (status post PPM) who was admitted to [facility] on 4/12 as a transfer from [place] for management of acute pancreatitis with suspected biliary etiology following initial presentation on 3/30. She is status post attempted ERCP at [place] on 4/4 with inability to cannulate due to reported difficulty visualizing ampulla. LFT's have progressively improved since admission with bilirubin normalizing by 4/2. She has no signs/symptoms of cholangitis here or on initial admission. Per review of MAR from [place], last dose of Apixaban was on 4/2.     Recommendations:     -Will proceed with EUS +/- ERCP today.   -Keep NPO.

## 2024-04-12 NOTE — NURSING TRANSFER
Nursing Transfer Note      4/12/2024   2:29 PM    Nurse giving handoff:Valentina CARDONA  Nurse receiving handoff:Rachid CARDONA    Reason patient is being transferred: MD order    Transfer To: [id number]    Transfer via stretcher    Transfer with  to O2    Transported by PCT  Order for Tele Monitor? No    Additional Lines: Oxygen        Patient belongings transferred with patient: No    Chart send with patient: Yes    Notified: daughter    Patient reassessed at: 1428 (date, time)  1  Upon arrival to floor: call bell in reach and bed in lowest position

## 2024-04-12 NOTE — PLAN OF CARE
Pt engaged well in therapy this date.     Problem: Occupational Therapy  Goal: Occupational Therapy Goal  Description: Goals to be met by: 5/12/24     Patient will increase functional independence with ADLs by performing:    UE Dressing with Contact Guard Assistance.  LE Dressing with Moderate Assistance.  Grooming while EOB with Stand-by Assistance.  Toileting from bedside commode with Contact Guard Assistance for hygiene and clothing management.   Rolling to Bilateral with Contact Guard Assistance.   Supine to sit with Contact Guard Assistance.  Stand pivot transfers with Moderate Assistance.with AD as needed  Step transfer with Moderate Assistance with AD as needed  Toilet transfer to bedside commode with Moderate Assistance with AD as needed.    4/12/2024 1313 by Gracie Valdez, ADA  Outcome: Ongoing, Progressing

## 2024-04-12 NOTE — SUBJECTIVE & OBJECTIVE
"No past medical history on file.    No past surgical history on file.    Review of patient's allergies indicates:   Allergen Reactions    Digoxin Anaphylaxis and Shortness Of Breath     Pt states that "she almost  from taking this medication"    Meperidine Shortness Of Breath    Morphine Shortness Of Breath    Penicillins Anaphylaxis    Ciprofloxacin Itching    Opioids - morphine analogues Itching, Nausea And Vomiting and Rash     Family History    None       Tobacco Use    Smoking status: Not on file    Smokeless tobacco: Not on file   Substance and Sexual Activity    Alcohol use: Not on file    Drug use: Not on file    Sexual activity: Not on file     Review of Systems   Constitutional:  Positive for fatigue. Negative for appetite change, chills and fever.   HENT:  Negative for congestion and trouble swallowing.    Eyes:  Negative for pain and redness.   Respiratory:  Negative for cough and shortness of breath.    Cardiovascular:  Negative for chest pain and palpitations.   Gastrointestinal:  Positive for abdominal pain. Negative for blood in stool, constipation, diarrhea, nausea and vomiting.   Genitourinary:  Negative for difficulty urinating and dysuria.   Musculoskeletal:  Negative for back pain and neck pain.   Skin:  Negative for rash.   Neurological:  Negative for dizziness, light-headedness and headaches.     Objective:     Vital Signs (Most Recent):  Temp: 99 °F (37.2 °C) (24)  Pulse: 84 (24)  Resp: 19 (24)  BP: (!) 146/63 (24)  SpO2: 98 % (24) Vital Signs (24h Range):  Temp:  [97.8 °F (36.6 °C)-99 °F (37.2 °C)] 99 °F (37.2 °C)  Pulse:  [78-87] 84  Resp:  [16-19] 19  SpO2:  [94 %-98 %] 98 %  BP: (125-146)/(60-74) 146/63        There is no height or weight on file to calculate BMI.      Intake/Output Summary (Last 24 hours) at 2024 0971  Last data filed at 2024 0648  Gross per 24 hour   Intake --   Output 350 ml   Net -350 ml "       Lines/Drains/Airways       Peripheral Intravenous Line  Duration                  Peripheral IV - Single Lumen 04/11/24 0000 Anterior;Right Upper Arm 1 day                     Physical Exam  Constitutional:       Appearance: Normal appearance. She is not ill-appearing.   Eyes:      General: No scleral icterus.  Cardiovascular:      Rate and Rhythm: Normal rate and regular rhythm.      Pulses: Normal pulses.      Heart sounds: Normal heart sounds.   Pulmonary:      Effort: Pulmonary effort is normal. No respiratory distress.      Breath sounds: Normal breath sounds.   Abdominal:      General: Bowel sounds are normal. There is no distension.      Palpations: Abdomen is soft.      Tenderness: There is abdominal tenderness in the right upper quadrant, epigastric area and left upper quadrant.   Skin:     General: Skin is warm and dry.      Coloration: Skin is not jaundiced.      Findings: No rash.   Neurological:      Mental Status: She is alert and oriented to person, place, and time.          Significant Labs:  All pertinent lab results from the last 24 hours have been reviewed.    Significant Imaging:  Imaging results within the past 24 hours have been reviewed.

## 2024-04-12 NOTE — SUBJECTIVE & OBJECTIVE
No past medical history on file.    No past surgical history on file.    Review of patient's allergies indicates:  Not on File    No current facility-administered medications on file prior to encounter.     No current outpatient medications on file prior to encounter.     Family History    None       Tobacco Use    Smoking status: Not on file    Smokeless tobacco: Not on file   Substance and Sexual Activity    Alcohol use: Not on file    Drug use: Not on file    Sexual activity: Not on file     Review of Systems   Constitutional:  Negative for appetite change, chills and fever.   HENT:  Negative for congestion.    Respiratory:  Negative for cough and shortness of breath.    Cardiovascular:  Positive for leg swelling. Negative for chest pain.   Gastrointestinal:  Positive for abdominal pain and constipation. Negative for blood in stool, diarrhea, nausea and vomiting.   Genitourinary:  Negative for difficulty urinating.   Musculoskeletal:  Positive for back pain.   Skin:  Negative for rash.   Neurological:  Negative for light-headedness.   Psychiatric/Behavioral:  Negative for confusion.      Objective:     Vital Signs (Most Recent):  Temp: 97.9 °F (36.6 °C) (04/12/24 0318)  Pulse: 87 (04/12/24 0318)  Resp: 16 (04/12/24 0318)  BP: 125/74 (04/12/24 0318)  SpO2: 97 % (04/12/24 0318) Vital Signs (24h Range):  Temp:  [97.8 °F (36.6 °C)-98.7 °F (37.1 °C)] 97.9 °F (36.6 °C)  Pulse:  [78-87] 87  Resp:  [16] 16  SpO2:  [94 %-99 %] 97 %  BP: (125-145)/(60-74) 125/74        There is no height or weight on file to calculate BMI.     Physical Exam  Vitals and nursing note reviewed.   Constitutional:       General: She is not in acute distress.     Appearance: She is normal weight. She is not toxic-appearing.   HENT:      Head: Normocephalic and atraumatic.      Right Ear: External ear normal.      Left Ear: External ear normal.      Nose: Nose normal.   Eyes:      General: No scleral icterus.  Cardiovascular:      Rate and  Rhythm: Normal rate. Rhythm irregular.      Heart sounds: No murmur heard.  Pulmonary:      Effort: Pulmonary effort is normal. No respiratory distress.      Breath sounds: No wheezing.      Comments: Decreased breath sounds  Abdominal:      General: Abdomen is flat. Bowel sounds are normal. There is no distension.      Tenderness: There is abdominal tenderness (mild epigastric and RUQ). There is no rebound.   Musculoskeletal:      Right lower leg: Edema (pitting edema to buttocks) present.      Left lower leg: Edema present.   Neurological:      Mental Status: She is alert. Mental status is at baseline.                Significant Labs: All pertinent labs within the past 24 hours have been reviewed.    Significant Imaging: I have reviewed all pertinent imaging results/findings within the past 24 hours.

## 2024-04-12 NOTE — PLAN OF CARE
"  [facility]    FACILITY TRANSFER ORDERS      Patient Name: Zeny Tijerina  YOB: 1930    PCP: Thuy, Primary Doctor   PCP Address: None  PCP Phone Number: None  PCP Fax: None    Encounter Date: 2024    Admit to: [place]    Vital Signs:  Routine    Diagnoses:   Active Hospital Problems    Diagnosis  POA    *Transaminitis [R74.01]  Yes    Acute pancreatitis [K85.90]  Yes    Atrial fibrillation [I48.91]  Yes     Chronic    CKD (chronic kidney disease) stage 3, GFR 30-59 ml/min [N18.30]  Yes     Chronic    Hypertension [I10]  Yes     Chronic    History of sick sinus syndrome [Z86.79]  Not Applicable      Resolved Hospital Problems   No resolved problems to display.       Allergies:  Review of patient's allergies indicates:   Allergen Reactions    Digoxin Anaphylaxis and Shortness Of Breath     Pt states that "she almost  from taking this medication"    Meperidine Shortness Of Breath    Morphine Shortness Of Breath    Penicillins Anaphylaxis    Ciprofloxacin Itching    Opioids - morphine analogues Itching, Nausea And Vomiting and Rash       Diet: Clear liquid diet, advance as tolerated    Activities: Activity as tolerated    Goals of Care Treatment Preferences:  Code Status: DNR      Nursing: Stepdown     Labs: CBC, CMP, and LFTs  Daily for 3 days     CONSULTS:    Physical Therapy to evaluate and treat.  and Occupational Therapy to evaluate and treat.    MISCELLANEOUS CARE:  N/A    WOUND CARE ORDERS  None    Medications: Review discharge medications with patient and family and provide education.      Current Discharge Medication List        CONTINUE these medications which have NOT CHANGED    Details   amLODIPine (NORVASC) 2.5 MG tablet Take 2.5 mg by mouth once daily.      cyanocobalamin 1,000 mcg/mL injection Inject 1,000 mcg into the muscle once daily.      ELIQUIS 2.5 mg Tab Take 2.5 mg by mouth 2 (two) times daily.      EScitalopram oxalate (LEXAPRO) 10 MG tablet Take 10 mg by " mouth.      furosemide (LASIX) 40 MG tablet Take 40 mg by mouth.      lisinopriL (PRINIVIL,ZESTRIL) 40 MG tablet Take 40 mg by mouth.      metoprolol succinate (TOPROL-XL) 100 MG 24 hr tablet Take 100 mg by mouth.      oxybutynin (DITROPAN-XL) 10 MG 24 hr tablet Take 10 mg by mouth.      potassium chloride (KLOR-CON) 10 MEQ TbSR Take 10 mEq by mouth once daily.      cholecalciferol, vitamin D3, 1,250 mcg (50,000 unit) capsule Take 50,000 Units by mouth every 7 days.      traZODone (DESYREL) 50 MG tablet Take 50 mg by mouth every evening.                Immunizations Administered as of 4/12/2024       No immunizations on file.            Unknown    Some patients may experience side effects after vaccination.  These may include fever, headache, muscle or joint aches.  Most symptoms resolve with 24-48 hours and do not require urgent medical evaluation unless they persist for more than 72 hours or symptoms are concerning for an unrelated medical condition.          _________________________________  Cornel Benavides MD  04/12/2024

## 2024-04-12 NOTE — PT/OT/SLP EVAL
Physical Therapy   Co-Evaluation    Patient Name:  Zeny Tijerina   MRN:  [id number]    Recommendations:     Discharge Recommendations: Moderate Intensity Therapy   Discharge Equipment Recommendations: walker, rolling, bedside commode  Justification for Walker HME   The mobility limitation cannot be sufficiently resolved by the use of a cane.   Patient's functional mobility deficit can be sufficiently resolved with the use of a walker.  Patient's mobility limitation significantly impairs their ability to participate in one of more activities of daily living.  The use of a walker will significantly improve the patients ability to participate in MRADLS and the patient will use it on regular basis in the home.   Patient has a mobility limitation that significantly impairs their ability to participate in one or more mobility related activities of daily living, including toileting. This deficit can be resolved by using a bedside commode. Patient demonstrates mobility limitations that will cause them to be confined to one room at home without bathroom access for up to 30 days. Using a bedside commode will greatly improve the patient's ability to participate in MRADLs.   Barriers to discharge:  requires increased level of skilled assist    Assessment:     Zeny Tijerina is a 89 y.o. female admitted with a medical diagnosis of Transaminitis.  She presents with the following impairments/functional limitations: weakness, impaired endurance, impaired functional mobility, gait instability, impaired balance, pain, decreased lower extremity function, decreased upper extremity function, impaired coordination Patient is pleasantly agreeable to therapy this date. She completed bed mobility, sitting balance at EOB, and multiple transfers during session. Limited by pain and poor tolerance this date. Future sessions to emphasize further mobility, and possibly transfers to chair as appropriate. High fall risk at this time d/t history of  falls and current level of function being well below baseline. Patient will continue to benefit from skilled PT during this admit to address BLE strength and endurance deficits, and maximize independence with functional mobility.    Rehab Prognosis: Good; patient would benefit from acute skilled PT services to address these deficits and reach maximum level of function.    Recent Surgery: Procedure(s) (LRB):  ERCP (ENDOSCOPIC RETROGRADE CHOLANGIOPANCREATOGRAPHY) (N/A)  ULTRASOUND, UPPER GI TRACT, ENDOSCOPIC (N/A) Day of Surgery    Plan:     During this hospitalization, patient to be seen 4 x/week to address the identified rehab impairments via gait training, therapeutic activities, therapeutic exercises, neuromuscular re-education and progress toward the following goals:    Plan of Care Expires:  05/12/24    Subjective     Chief Complaint: LBP  Patient/Family Comments/goals: gain strength, return to PLOF  Pain/Comfort:  Pain Rating 1: 0/10  Location - Side 1: Bilateral  Location 1: back  Pain Addressed 1: Reposition, Distraction  Pain Rating Post-Intervention 1: 9/10    Patients cultural, spiritual, Islam conflicts given the current situation: no    Living Environment:  Patient lives at home with her  and daughter in a [facility] with threshold [place]. The bathroom contains a walk-in shower with grab bars and shower chair inside.  Prior to admission, patients level of function was mod I with rollator usage. Reporting 3-4 falls in the last year, most recent occurring (roughly) in early 2024.  Equipment used at home: rollator, shower chair ([employer]), shower chair.  DME owned (not currently used): none.  Upon discharge, patient will have LIMITED assistance from 89 year old  and daughter (currently with broken shoulder, unable to drive or provide physical assist).    Objective:     Communicated with RN prior to session.  Patient found HOB elevated with bed alarm, peripheral IV, telemetry, oxygen  upon PT  entry to room.    General Precautions: Standard, fall  Orthopedic Precautions:N/A   Braces: N/A  Respiratory Status: Nasal cannula, flow 2 L/min    Exams:  Cognitive Exam:  Patient is oriented to Person, Place, Time, and Situation  Gross Motor Coordination:  WFL  Postural Exam:  Patient presented with the following abnormalities:    -       Rounded shoulders  -       Forward head  RLE ROM: WFL  RLE Strength: grossly 3+/5  LLE ROM: WFL  LLE Strength: grossly 3+/5    Functional Mobility:  Bed Mobility:     Rolling Left:  minimum assistance and multiple trials for cleanliness, linen adjustment  Rolling Right: minimum assistance and multiple trials for cleanliness, linen adjustments  Scooting:   R laterally in seated position toward HOB: max x2 and x2 trials  Supine to HOB via drawsheet: total x2  Anteriorly toward EOB to plant feet on floor: max  Supine to Sit: minimum assistance, of 2 persons, and HOB elevated, increased time and cues for sequencing  Sit to Supine: moderate assistance and of 2 persons, patient reports increase in pain  Transfers:     Sit to Stand:    Trial 1: maximal assistance and of 2 persons with rolling walker and unable to fully clear hips from bed, despite verbal/tactile cues  Trial 2: mod x2 with improved hip/buttock/foot positioning, verbal/tactile cues for upright posture with BUE support on RW  Balance:   Sitting: good balance, progressively fatigued leading to forward flexed posture  Standing: mod/Max A x2 with RW to maintain, approx 1 min      AM-PAC 6 CLICK MOBILITY  Total Score:11       Treatment & Education:  Patient educated on calling for assistance for any needs to improve overall safety awareness.  Patient educated on role of PT in acute care, PT POC, and PT goals.  All items placed within reach, and notified on call light usage for assistance with any needs for fall prevention.    Patient left HOB elevated with all lines intact, call button in reach, and RN notified.    GOALS:    Multidisciplinary Problems       Physical Therapy Goals          Problem: Physical Therapy    Goal Priority Disciplines Outcome Goal Variances Interventions   Physical Therapy Goal     PT, PT/OT Ongoing, Progressing     Description: Goals to be met by: 24     Patient will increase functional independence with mobility by performin. Supine to sit with Stand-by Assistance  2. Sit to supine with Stand-by Assistance  3. Sit to stand transfer with Contact Guard Assistance  4. Bed to chair transfer with Contact Guard Assistance using Rolling Walker  5. Gait  x 100 feet with Contact Guard Assistance using Rolling Walker.   6. Ascend/Descend 4 inch curb step with Contact Guard Assistance using Rolling Walker.  7. Lower extremity exercise program x15 reps per handout, with assistance as needed                         History:     Past Medical History:   Diagnosis Date    Atrial fibrillation     CKD (chronic kidney disease)     MICHELLE (obstructive sleep apnea)        History reviewed. No pertinent surgical history.    Time Tracking:     PT Received On: 24  PT Start Time: 903     PT Stop Time: 943  PT Total Time (min): 40 min     Billable Minutes: Evaluation 10 and Therapeutic Activity 30  Patient required co-evaluation with OT for highest quality care d/t decreased activity tolerance and increased level of assistance necessary.     2024

## 2024-04-12 NOTE — HPI
Ms. Zeny Tijerina is a 89 year old female for whom AES is consulted for evaluation of biliary obstruction. She has a PMH significant for atrial fibrillation (on Apixaban), HTN, MICHELLE, and sick sinus syndrome (status post PPM).     Hospital Course:   Patient was admitted to [facility] on 3/30/2024 for management of acute pancreatitis associated with EMILE after developing acute onset bilateral upper abdominal pain radiating to the back. CTA C/A/P obtained on admission to investigate possible aortic dissection was only notable for AAA with inflammatory changes around pancreas. She underwent an MRCP on 3/31 that was notable for extra-hepatic biliary dilatation without associated stone or lesion seen. GI was consulted and patient underwent ERCP on 4/4 with inability to cannulate biliary tree due to reported difficulty visualizing the ampulla. EMILE and elevated LFT's progressively improved post-procedure and bilirubin normalized by 4/8. Patient continued to experience post-prandial abdominal pain and decision was made to transfer patient for consideration of repeat endoscopic evaluation. She was accepted for transfer on 4/8 and arrived here on 4/12.     On initial bedside interview, patient reports continued bilateral upper abdominal pain that is aggravated by attempted PO intake. She reports only consuming liquids since diet was resumed at [facility]. She denies prior lifetime history of pancreatitis or liver disease, melena, hematochezia, pale colored stools, preceding skin/eye yellowing, and prior abdominal surgeries.

## 2024-04-12 NOTE — PLAN OF CARE
Problem: Physical Therapy  Goal: Physical Therapy Goal  Description: Goals to be met by: 24     Patient will increase functional independence with mobility by performin. Supine to sit with Stand-by Assistance  2. Sit to supine with Stand-by Assistance  3. Sit to stand transfer with Contact Guard Assistance  4. Bed to chair transfer with Contact Guard Assistance using Rolling Walker  5. Gait  x 100 feet with Contact Guard Assistance using Rolling Walker.   6. Ascend/Descend 4 inch curb step with Contact Guard Assistance using Rolling Walker.  7. Lower extremity exercise program x15 reps per handout, with assistance as needed    PT Evaluation completed 2024   PT goals and POC established.     Outcome: Ongoing, Progressing

## 2024-04-12 NOTE — TREATMENT PLAN
Post-Procedure Gastroenterology Treatment Plan:     Zeny Tijerina is status post EUS with the following findings:     - Hyperechoic material consistent with sludge was visualized endosonographically in the gallbladder body.   - No dilatation or stone was noted in bile duct; ERCP not performed.   - Pancreatic parenchymal abnormalities consisting of diffuse echogenicity lobularity and stranding noted in the entire pancreas consistent with severe/complicated pancreatitis.      Our recommendations are as follows:     -Okay to resume bland diet if reporting hunger.   -Pain control PRN.   -Obtain CT A/P with pancreas protocol to re-evaluate pancreas and assess for developing sheyla-pancreatic fluid collections.   -May need repeat EUS once inflammation improves to ensure no underlying mass/lesion in pancreas.         Chester Thurman MD, PGY-VI  Gastroenterology Fellow  [facility]

## 2024-04-12 NOTE — HPI
He's good to go, there was an issue with th diagnosis codes that were submitted witht the claim   Zeny Tijerina is a 89yo female with HTN, afib (on eliquis), SSS s/p PCM, CKD3 who presented as a transfer from [facility] for ERCP with difficult anatomy. She presented to the ED 3/30/24 with abdominal pain radiating to her back, found to have pancreatitis with initial lipase > 3000, EMILE, and elevated LFT's. MRCP with extrahepatic biliary dilatation without a definite filling defect identified to suggest choledocholithiasis, gradual tapering of the common bile duct which could represent stricture at the ampulla, concern for a mass, and acute pancreatitis HIDA scan which was concerning for common duct obstruction. She was started on IVF, but they had to be discontinued due to development of pulmonary edema. Echo showed borderline low EF. ERCP performed on Thursday and attempted to stent her but unable to do so due to difficulty visualizing due to angulation of her anatomy. Initial plan was to do repeat ERCP as outpatient however patient has been unable to tolerate PO intake and still with abdominal pain so transfer request for ERCP/EUS at [facility]. Vitals stable, on room air. Tbili 4.2, now down to normal. LFTs improved.     On arrival to [facility], patient afebrile, VSS, on 2L NC SpO2 97%. She reports that she continues to have upper abdominal pain with radiation to back. She denies nausea/vomiting. She reports last BM was 2 days after admission to [facility], but she has only been taking in liquids. Denies f/c. Some SOB, does not wear O2 at home.

## 2024-04-12 NOTE — ASSESSMENT & PLAN NOTE
Chronic, controlled. Latest blood pressure and vitals reviewed-     Temp:  [97.8 °F (36.6 °C)-98.7 °F (37.1 °C)]   Pulse:  [78-87]   Resp:  [16]   BP: (125-145)/(60-74)   SpO2:  [94 %-99 %] .   Home meds for hypertension were reviewed and noted below.   Per outside records, taking amlodipine 2.5mg and lisinopril 40mg  Lasix 20mg PRN for weight gain  Metoprolol succinate 100mg daily    While in the hospital, will manage blood pressure as follows; Adjust home antihypertensive regimen as follows- continue metoprolol succinate    Will utilize p.r.n. blood pressure medication only if patient's blood pressure greater than 180/110 and she develops symptoms such as worsening chest pain or shortness of breath.

## 2024-04-12 NOTE — ASSESSMENT & PLAN NOTE
89F transferred from [facility] for ERCP with difficult anatomy. MRCP and HIDA with concern for common duct obstruction and possible mass. Also noted to have acute pancreatitis. She was on IVF but had to be d/c due to development of pulmonary edema. EF 50-55%. Still on 2L O2 and with pitting edema to buttocks on exam. Liver enzymes, T bili, and lipase now normalized but with persistent pain.  PRN pain control, was receiving IV dilaudid at OSH, reported multiple allergies to pain meds  F/u CMP, lipase  F/u PT-INR  One time dose lasix 20mg IV, consider additional dosing as appropriate  AES consult  NPO

## 2024-04-12 NOTE — TRANSFER OF CARE
"Anesthesia Transfer of Care Note    Patient: Zeny Tijerina    Procedure(s) Performed: Procedure(s) (LRB):  ERCP (ENDOSCOPIC RETROGRADE CHOLANGIOPANCREATOGRAPHY) (N/A)  ULTRASOUND, UPPER GI TRACT, ENDOSCOPIC (N/A)    Patient location: PACU    Anesthesia Type: general    Transport from OR: Transported from OR on 6-10 L/min O2 by face mask with adequate spontaneous ventilation    Post pain: adequate analgesia    Post assessment: no apparent anesthetic complications and tolerated procedure well    Post vital signs: stable    Level of consciousness: awake    Nausea/Vomiting: no nausea/vomiting    Complications: none    Transfer of care protocol was followed    Last vitals: Visit Vitals  BP (!) 147/67 (BP Location: Left arm, Patient Position: Lying)   Pulse 91   Temp 36.1 °C (97 °F) (Tympanic)   Resp 14   Ht 5' 7" (1.702 m)   Wt 90.7 kg (200 lb)   SpO2 95%   Breastfeeding No   BMI 31.32 kg/m²     "

## 2024-04-12 NOTE — ASSESSMENT & PLAN NOTE
Patient with atrial fibrillation which is uncontrolled currently with Beta Blocker. Patient is currently in atrial fibrillation.AEGFG3ALCv Score: 3. Anticoagulation indicated. Anticoagulation done with heparin . On Eliquis at home, was getting lovenox at OSH.  Holding AC in setting of pending procedure, resume when appropriate

## 2024-04-12 NOTE — ASSESSMENT & PLAN NOTE
Creatine stable for now. BMP reviewed- noted CrCl cannot be calculated (Unknown ideal weight.). according to latest data. Based on current GFR, CKD stage is stage 3 - GFR 30-59.  Monitor UOP and serial BMP and adjust therapy as needed. Renally dose meds. Avoid nephrotoxic medications and procedures. Baseline Cr ~1.0

## 2024-04-13 NOTE — PLAN OF CARE
Problem: Adult Inpatient Plan of Care  Goal: Plan of Care Review  Outcome: Ongoing, Progressing  Goal: Patient-Specific Goal (Individualized)  Outcome: Ongoing, Progressing  Goal: Absence of Hospital-Acquired Illness or Injury  Outcome: Ongoing, Progressing  Goal: Optimal Comfort and Wellbeing  Outcome: Ongoing, Progressing  Goal: Readiness for Transition of Care  Outcome: Ongoing, Progressing     Problem: Skin Injury Risk Increased  Goal: Skin Health and Integrity  Outcome: Ongoing, Progressing   Pt awaiting to be transferred from facility. Pt in bed with the call light in reach and the bed alarm on. Report given to oncoming shift nurse.

## 2024-04-13 NOTE — PLAN OF CARE
[facility] - Telemetry Stepdown  Discharge Final Note    Primary Care Provider: Thuy, Primary Doctor    Expected Discharge Date: 4/12/2024    Transfer request accepted for return to [facility].   Transportation arranged by [facility].    Final Discharge Note (most recent)       Final Note - 04/12/24 1915          Final Note    Assessment Type Final Discharge Note     Anticipated Discharge Disposition Planned Readmission - Discharged to other facility         Post-Acute Status    Post-Acute Authorization Other     Coverage [facility] MCARE [facility] MCARE ADV     Other Status --   Return to [facility]                    Important Message from Medicare

## 2024-04-13 NOTE — PLAN OF CARE
[facility] - Telemetry Stepdown  Initial Discharge Assessment       Primary Care Provider: Thuy, Primary Doctor    Admission Diagnosis: Biliary obstruction [K83.1]    Admission Date: 4/12/2024  Expected Discharge Date: 4/12/2024    Transition of Care Barriers: None    Payor: [employer] MCARE [facility] / Plan: [employer] ADV / Product Type: Medicare Advantage /     Extended Emergency Contact Information  Primary Emergency Contact: DACIA WHITESIDE  Home Phone: [phone]  Mobile Phone: [phone]  Relation: Daughter  Secondary Emergency Contact: CATALINO LIPSCOMB  Mobile Phone: [phone]  Relation: Relative    Discharge Plan A: Home with family  Discharge Plan B: Home Health    No Pharmacies Listed    Initial Assessment (most recent)       Adult Discharge Assessment - 04/12/24 1525          Discharge Assessment    Assessment Type Discharge Planning Assessment     Confirmed/corrected address, phone number and insurance Yes     Confirmed Demographics Correct on Facesheet     Source of Information patient     Reason For Admission biliary obstruction     People in Home child(justine), adult     Facility Arrived From: [facility]     Do you expect to return to your current living situation? Yes     Do you have help at home or someone to help you manage your care at home? Yes     Prior to hospitilization cognitive status: Alert/Oriented     Current cognitive status: Alert/Oriented     Walking or Climbing Stairs Difficulty yes     Walking or Climbing Stairs ambulation difficulty, requires equipment     Mobility Management walker, cane, scooter     Home Layout Able to live on 1st floor     Equipment Currently Used at Home rollator;shower chair;cane, straight   scooter    Readmission within 30 days? No     Do you currently have service(s) that help you manage your care at home? No     Do you take prescription medications? Yes     Do you have prescription coverage? Yes     Coverage [employer] MCARE  [facility] MCARE ADV     Do you have any problems affording any of your prescribed medications? No     Is the patient taking medications as prescribed? yes     How do you get to doctors appointments? family or friend will provide     Are you on dialysis? No     Discharge Plan A Home with family     Discharge Plan B Home Health     DME Needed Upon Discharge  --   TBD    Discharge Plan discussed with: Patient     Transition of Care Barriers None        Physical Activity    On average, how many days per week do you engage in moderate to strenuous exercise (like a brisk walk)? 0 days     On average, how many minutes do you engage in exercise at this level? 0 min        Financial Resource Strain    How hard is it for you to pay for the very basics like food, housing, medical care, and heating? Not very hard        Housing Stability    In the last 12 months, was there a time when you were not able to pay the mortgage or rent on time? No     In the last 12 months, how many places have you lived? 1     In the last 12 months, was there a time when you did not have a steady place to sleep or slept in a shelter (including now)? No        Transportation Needs    In the past 12 months, has lack of transportation kept you from medical appointments or from getting medications? No     In the past 12 months, has lack of transportation kept you from meetings, work, or from getting things needed for daily living? No        Food Insecurity    Within the past 12 months, you worried that your food would run out before you got the money to buy more. Never true     Within the past 12 months, the food you bought just didn't last and you didn't have money to get more. Never true        Stress    Do you feel stress - tense, restless, nervous, or anxious, or unable to sleep at night because your mind is troubled all the time - these days? Patient declined        Social Connections    In a typical week, how many times do you  talk on the phone with family, friends, or neighbors? More than three times a week     How often do you get together with friends or relatives? More than three times a week     How often do you attend Jehovah's witness or Muslim services? 1 to 4 times per year     Do you belong to any clubs or organizations such as Jehovah's witness groups, unions, fraternal or athletic groups, or school groups? No     How often do you attend meetings of the clubs or organizations you belong to? Never     Are you , , , , never , or living with a partner? --   single       Alcohol Use    Q1: How often do you have a drink containing alcohol? Patient declined     Q2: How many drinks containing alcohol do you have on a typical day when you are drinking? Patient declined     Q3: How often do you have six or more drinks on one occasion? Patient declined

## 2024-04-14 NOTE — PROGRESS NOTES
Called  [facility] about this patient and new CT results, only available after transfer.    Spoke directly to Dr. Bess, hospitalist, ([phone]) whom was the accepting provider.      Read the impression of the CT scan verbatim.     Inquired if theres anything else I could help with, nothing at this time.